# Patient Record
Sex: FEMALE | URBAN - METROPOLITAN AREA
[De-identification: names, ages, dates, MRNs, and addresses within clinical notes are randomized per-mention and may not be internally consistent; named-entity substitution may affect disease eponyms.]

---

## 2019-05-29 ENCOUNTER — OFFICE VISIT (OUTPATIENT)
Dept: URBAN - METROPOLITAN AREA CLINIC 45 | Facility: CLINIC | Age: 29
End: 2019-05-29
Payer: COMMERCIAL

## 2019-05-29 DIAGNOSIS — H53.19 OTHER SUBJECTIVE VISUAL DISTURBANCES: ICD-10-CM

## 2019-05-29 DIAGNOSIS — H40.053 OCULAR HYPERTENSION, BILATERAL: ICD-10-CM

## 2019-05-29 PROCEDURE — 99203 OFFICE O/P NEW LOW 30 MIN: CPT | Performed by: OPTOMETRIST

## 2019-05-29 ASSESSMENT — INTRAOCULAR PRESSURE
OD: 19
OS: 17

## 2019-05-29 ASSESSMENT — KERATOMETRY
OD: 46.25
OS: 47.00

## 2019-05-29 NOTE — IMPRESSION/PLAN
Impression: Other subjective visual disturbances: H53.19. Plan: possibly due Hypertension pt to see PCP. No ocular complication at this time Will continue to observe.

## 2019-05-29 NOTE — IMPRESSION/PLAN
Impression: Ocular hypertension, bilateral: H40.053. Plan: NO ocular complications.  Pt to see pcp for ful work up

## 2020-05-28 ENCOUNTER — OFFICE VISIT (OUTPATIENT)
Dept: URBAN - METROPOLITAN AREA CLINIC 45 | Facility: CLINIC | Age: 30
End: 2020-05-28
Payer: COMMERCIAL

## 2020-05-28 DIAGNOSIS — D23.111 OTHER BENIGN NEOPLASM SKIN/ RIGHT UPPER EYELID, INC CANTHUS: ICD-10-CM

## 2020-05-28 DIAGNOSIS — H04.123 DRY EYE SYNDROME OF BILATERAL LACRIMAL GLANDS: ICD-10-CM

## 2020-05-28 DIAGNOSIS — H52.13 MYOPIA, BILATERAL: ICD-10-CM

## 2020-05-28 PROCEDURE — 92014 COMPRE OPH EXAM EST PT 1/>: CPT | Performed by: OPTOMETRIST

## 2020-05-28 ASSESSMENT — VISUAL ACUITY
OD: 20/20
OS: 20/20

## 2020-05-28 ASSESSMENT — INTRAOCULAR PRESSURE
OD: 21
OS: 19

## 2020-05-28 NOTE — IMPRESSION/PLAN
Impression: Other subjective visual disturbances: H53.19.  Plan: TRY NEW SPECS, IF NO IMPROVEMENT RTC

## 2020-05-28 NOTE — IMPRESSION/PLAN
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 Plan: Consult recommended [OcuPlastic Surgeon].

## 2020-06-17 ENCOUNTER — OFFICE VISIT (OUTPATIENT)
Dept: URBAN - METROPOLITAN AREA CLINIC 45 | Facility: CLINIC | Age: 30
End: 2020-06-17
Payer: COMMERCIAL

## 2020-06-17 DIAGNOSIS — Q27.30: Primary | ICD-10-CM

## 2020-06-17 PROCEDURE — 92285 EXTERNAL OCULAR PHOTOGRAPHY: CPT | Performed by: OPHTHALMOLOGY

## 2020-06-17 PROCEDURE — 99204 OFFICE O/P NEW MOD 45 MIN: CPT | Performed by: OPHTHALMOLOGY

## 2020-06-17 ASSESSMENT — INTRAOCULAR PRESSURE
OD: 18
OS: 18

## 2020-11-02 ENCOUNTER — CONSULT (OUTPATIENT)
Dept: URBAN - METROPOLITAN AREA CLINIC 56 | Facility: CLINIC | Age: 30
End: 2020-11-02
Payer: COMMERCIAL

## 2020-11-02 DIAGNOSIS — D48.1 NEOPLASM OF UNCERTIAN BEHAVIOR OF EYELID: Primary | ICD-10-CM

## 2020-11-02 PROCEDURE — 99203 OFFICE O/P NEW LOW 30 MIN: CPT | Performed by: OPHTHALMOLOGY

## 2020-11-02 PROCEDURE — 92285 EXTERNAL OCULAR PHOTOGRAPHY: CPT | Performed by: OPHTHALMOLOGY

## 2021-03-11 NOTE — IMPRESSION/PLAN
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 This has happened in multiple occasions. Plan: Discussed diagnosis in detail with patient. Discussed treatment options with patient. Recommend referral for neurovascular interventional management at [address] [place] [address]. no